# Patient Record
Sex: MALE | Race: WHITE
[De-identification: names, ages, dates, MRNs, and addresses within clinical notes are randomized per-mention and may not be internally consistent; named-entity substitution may affect disease eponyms.]

---

## 2018-09-04 ENCOUNTER — HOSPITAL ENCOUNTER (EMERGENCY)
Dept: HOSPITAL 7 - FB.ED | Age: 28
Discharge: HOME | End: 2018-09-04
Payer: SELF-PAY

## 2018-09-04 DIAGNOSIS — F17.210: ICD-10-CM

## 2018-09-04 DIAGNOSIS — K02.9: ICD-10-CM

## 2018-09-04 DIAGNOSIS — K04.7: Primary | ICD-10-CM

## 2018-09-04 NOTE — EDM.PDOC
ED HPI GENERAL MEDICAL PROBLEM





- General


Chief Complaint: General


Stated Complaint: TOOTH PAIN


Time Seen by Provider: 09/04/18 15:20


Source of Information: Reports: Patient


History Limitations: Reports: No Limitations





- History of Present Illness


INITIAL COMMENTS - FREE TEXT/NARRATIVE: 





Tay comes into [facility] ED with a painful tender R lower mandible over the past 

few days that is progressively worse. He has a PMH of multiple caried teeth and 

recommendations for full extraction by a DDS, but has not followed through. He 

has tried NSAIDs for pain relief.


  ** Right Lower Oral/Mouth


Pain Score (Numeric/FACES): 2





- Related Data


 Allergies











Allergy/AdvReac Type Severity Reaction Status Date / Time


 


No Known Allergies Allergy   Verified 09/04/18 15:21











Home Meds: 


 Home Meds





Clindamycin HCl [Cleocin HCl] 300 mg PO TID #20 capsule 09/04/18 [Rx]











Past Medical History


HEENT History: Reports: Other (See Below)


Other HEENT History: states has 'bad' teeth and has had a lot of them pulled in 

past





Social & Family History





- Tobacco Use


Smoking Status *Q: Current Every Day Smoker


Years of Tobacco use: 10


Packs/Tins Daily: 0.5





- Caffeine Use


Caffeine Use: Reports: Soda





- Alcohol Use


Number of Drinks Per Day: 3


Date of Last Drink: 09/03/18





- Recreational Drug Use


Recreational Drug Use: No





ED ROS GENERAL





- Review of Systems


Review Of Systems: ROS reveals no pertinent complaints other than HPI.





ED EXAM, GENERAL





- Physical Exam


Exam: See Below


Exam Limited By: No Limitations


General Appearance: Alert, WD/WN, Anxious, Mild Distress, Thin


Eye Exam: Bilateral Eye: EOMI, Normal Inspection, PERRL


Ears: Normal External Exam, Normal TMs


Nose: Normal Inspection


Throat/Mouth: Normal Lips, Normal Oropharynx, Normal Voice, No Airway Compromise

, Other (multiple deeply caried teeth of upper and lower ridge; periapical 

abscess formation not seen but suspected on R mandible)


Neck: Normal Inspection, Supple, Lymphadenopathy (R), Tender Lateral


Respiratory/Chest: Lungs Clear


Cardiovascular: Normal Peripheral Pulses, Regular Rate, Rhythm


GI/Abdominal: Normal Bowel Sounds, Soft, Non-Tender


 (Male) Exam: Deferred


Rectal (Males) Exam: Deferred


Back Exam: Normal Inspection


Extremities: Normal Inspection


Neurological: Alert, Oriented, CN II-XII Intact, No Motor/Sensory Deficits


Psychiatric: Normal Affect, Anxious


Skin Exam: Warm, Dry, Intact, Tattoo(s)





Course





- Vital Signs


Text/Narrative:: 





No meds were administered during ED visit. 


Last Recorded V/S: 


 Last Vital Signs











Temp  37.3 C   09/04/18 15:10


 


Pulse      


 


Resp  17   09/04/18 15:10


 


BP  128/81   09/04/18 15:10


 


Pulse Ox  99   09/04/18 15:10














Departure





- Departure


Time of Disposition: 16:00


Disposition: Home, Self-Care 01


Condition: Fair


Clinical Impression: 


 Infected dental caries








- Discharge Information


*PRESCRIPTION DRUG MONITORING PROGRAM REVIEWED*: Not Applicable


*COPY OF PRESCRIPTION DRUG MONITORING REPORT IN PATIENT LI: Not Applicable


Prescriptions: 


Clindamycin HCl [Cleocin HCl] 300 mg PO TID #20 capsule


Referrals: 


PCP,None [Primary Care Provider] - 


Forms:  ED Department Discharge





- Problem List & Annotations


(1) Infected dental caries


SNOMED Code(s): 65969709


   Code(s): K02.9 - DENTAL CARIES, UNSPECIFIED; K04.7 - PERIAPICAL ABSCESS 

WITHOUT SINUS   Status: Acute   Annotation/Comment:: I dispensed Clindamycin 

300 mg cap tid for a week, NSAIDs for pain, and see a DDS regarding surgery.   





- Problem List Review


Problem List Initiated/Reviewed/Updated: Yes





- Assessment/Plan


Plan: 





Follow up with DDS.